# Patient Record
Sex: MALE | Race: WHITE | NOT HISPANIC OR LATINO | URBAN - METROPOLITAN AREA
[De-identification: names, ages, dates, MRNs, and addresses within clinical notes are randomized per-mention and may not be internally consistent; named-entity substitution may affect disease eponyms.]

---

## 2019-07-30 ENCOUNTER — EMERGENCY (EMERGENCY)
Facility: HOSPITAL | Age: 44
LOS: 1 days | Discharge: HOME | End: 2019-07-30
Attending: EMERGENCY MEDICINE | Admitting: EMERGENCY MEDICINE
Payer: COMMERCIAL

## 2019-07-30 VITALS
TEMPERATURE: 98 F | SYSTOLIC BLOOD PRESSURE: 100 MMHG | OXYGEN SATURATION: 98 % | RESPIRATION RATE: 18 BRPM | HEART RATE: 84 BPM | DIASTOLIC BLOOD PRESSURE: 61 MMHG

## 2019-07-30 VITALS
HEIGHT: 68 IN | SYSTOLIC BLOOD PRESSURE: 125 MMHG | RESPIRATION RATE: 16 BRPM | HEART RATE: 102 BPM | OXYGEN SATURATION: 98 % | WEIGHT: 197.09 LBS | DIASTOLIC BLOOD PRESSURE: 82 MMHG | TEMPERATURE: 98 F

## 2019-07-30 LAB
ALBUMIN SERPL ELPH-MCNC: 4.2 G/DL — SIGNIFICANT CHANGE UP (ref 3.3–5)
ALP SERPL-CCNC: 65 U/L — SIGNIFICANT CHANGE UP (ref 40–120)
ALT FLD-CCNC: 37 U/L — SIGNIFICANT CHANGE UP (ref 10–45)
ANION GAP SERPL CALC-SCNC: 13 MMOL/L — SIGNIFICANT CHANGE UP (ref 5–17)
AST SERPL-CCNC: 33 U/L — SIGNIFICANT CHANGE UP (ref 10–40)
BILIRUB SERPL-MCNC: 0.2 MG/DL — SIGNIFICANT CHANGE UP (ref 0.2–1.2)
BUN SERPL-MCNC: 7 MG/DL — SIGNIFICANT CHANGE UP (ref 7–23)
CALCIUM SERPL-MCNC: 9.4 MG/DL — SIGNIFICANT CHANGE UP (ref 8.4–10.5)
CHLORIDE SERPL-SCNC: 108 MMOL/L — SIGNIFICANT CHANGE UP (ref 96–108)
CO2 SERPL-SCNC: 24 MMOL/L — SIGNIFICANT CHANGE UP (ref 22–31)
CREAT SERPL-MCNC: 1.15 MG/DL — SIGNIFICANT CHANGE UP (ref 0.5–1.3)
GLUCOSE SERPL-MCNC: 136 MG/DL — HIGH (ref 70–99)
HCT VFR BLD CALC: 50.9 % — HIGH (ref 39–50)
HGB BLD-MCNC: 16.2 G/DL — SIGNIFICANT CHANGE UP (ref 13–17)
MCHC RBC-ENTMCNC: 27.8 PG — SIGNIFICANT CHANGE UP (ref 27–34)
MCHC RBC-ENTMCNC: 31.8 GM/DL — LOW (ref 32–36)
MCV RBC AUTO: 87.5 FL — SIGNIFICANT CHANGE UP (ref 80–100)
NRBC # BLD: 0 /100 WBCS — SIGNIFICANT CHANGE UP (ref 0–0)
PLATELET # BLD AUTO: 256 K/UL — SIGNIFICANT CHANGE UP (ref 150–400)
POTASSIUM SERPL-MCNC: 3.6 MMOL/L — SIGNIFICANT CHANGE UP (ref 3.5–5.3)
POTASSIUM SERPL-SCNC: 3.6 MMOL/L — SIGNIFICANT CHANGE UP (ref 3.5–5.3)
PROT SERPL-MCNC: 7.1 G/DL — SIGNIFICANT CHANGE UP (ref 6–8.3)
RBC # BLD: 5.82 M/UL — HIGH (ref 4.2–5.8)
RBC # FLD: 17.4 % — HIGH (ref 10.3–14.5)
SODIUM SERPL-SCNC: 145 MMOL/L — SIGNIFICANT CHANGE UP (ref 135–145)
TROPONIN T SERPL-MCNC: <0.01 NG/ML — SIGNIFICANT CHANGE UP (ref 0–0.01)
WBC # BLD: 10.42 K/UL — SIGNIFICANT CHANGE UP (ref 3.8–10.5)
WBC # FLD AUTO: 10.42 K/UL — SIGNIFICANT CHANGE UP (ref 3.8–10.5)

## 2019-07-30 PROCEDURE — 82962 GLUCOSE BLOOD TEST: CPT

## 2019-07-30 PROCEDURE — 93010 ELECTROCARDIOGRAM REPORT: CPT

## 2019-07-30 PROCEDURE — 82553 CREATINE MB FRACTION: CPT

## 2019-07-30 PROCEDURE — 93005 ELECTROCARDIOGRAM TRACING: CPT

## 2019-07-30 PROCEDURE — 99284 EMERGENCY DEPT VISIT MOD MDM: CPT

## 2019-07-30 PROCEDURE — 36415 COLL VENOUS BLD VENIPUNCTURE: CPT

## 2019-07-30 PROCEDURE — 84484 ASSAY OF TROPONIN QUANT: CPT

## 2019-07-30 PROCEDURE — 82550 ASSAY OF CK (CPK): CPT

## 2019-07-30 PROCEDURE — 80053 COMPREHEN METABOLIC PANEL: CPT

## 2019-07-30 PROCEDURE — 70450 CT HEAD/BRAIN W/O DYE: CPT | Mod: 26

## 2019-07-30 PROCEDURE — 99284 EMERGENCY DEPT VISIT MOD MDM: CPT | Mod: 25

## 2019-07-30 PROCEDURE — 85027 COMPLETE CBC AUTOMATED: CPT

## 2019-07-30 PROCEDURE — 70450 CT HEAD/BRAIN W/O DYE: CPT

## 2019-07-30 RX ORDER — SODIUM CHLORIDE 9 MG/ML
1000 INJECTION INTRAMUSCULAR; INTRAVENOUS; SUBCUTANEOUS ONCE
Refills: 0 | Status: COMPLETED | OUTPATIENT
Start: 2019-07-30 | End: 2019-07-30

## 2019-07-30 RX ADMIN — SODIUM CHLORIDE 1000 MILLILITER(S): 9 INJECTION INTRAMUSCULAR; INTRAVENOUS; SUBCUTANEOUS at 07:58

## 2019-07-30 RX ADMIN — SODIUM CHLORIDE 1000 MILLILITER(S): 9 INJECTION INTRAMUSCULAR; INTRAVENOUS; SUBCUTANEOUS at 07:59

## 2019-07-30 NOTE — ED PROVIDER NOTE - NEUROLOGICAL, MLM
Alert and oriented, no focal deficits, no motor deficits. pt reports " slightly gritty sensation in comparison to right w light touch of L side"  CN'II-XII intact, no pronator drift, nl finger to nose, clear speech, gait intact

## 2019-07-30 NOTE — ED ADULT NURSE REASSESSMENT NOTE - NS ED NURSE REASSESS COMMENT FT1
Patient FS 40, Dr. Castillo notified.  Patient is awake, A&O x 3, NAD, given two 8 oz cups of Apple juice.  Will recheck glucose in 15 mins.

## 2019-07-30 NOTE — ED ADULT NURSE NOTE - CHIEF COMPLAINT QUOTE
I have erratic blood sugar ( Type 1 Diabetes- on Humalog and Lantus)  , was 32 90 mins ago (drsia GONZALES ate gummy bears)  went up to 50 ; have labored breathing, loss of coordination; I'm also concern of brain concussion from fall 2 mos ago- I feel uncoordinated, pressure to head and eye

## 2019-07-30 NOTE — ED PROVIDER NOTE - CLINICAL SUMMARY MEDICAL DECISION MAKING FREE TEXT BOX
44 yo with transient resolving neurologic deficit at time of prolonged hypoglyemia, most likely secondary to hypoglycemia, doubt CVA, will CT head for surveillance for gross abnormality as pt with head trauma a month ago w persistent symptoms.   Dispo pending workup.

## 2019-07-30 NOTE — ED ADULT NURSE REASSESSMENT NOTE - NS ED NURSE REASSESS COMMENT FT1
Patient is awake, A&O x 3, NAD, presents to ED for hypoglycemia.  His FS at home in the 50's, he drink orange juice and ate gummy snacks, repeat FS in the 50's, patient became concerned and came to the ED for eval.  Also, complaining of SOB, pressure to the right sided of his head and numbness to his left extremity.  He is concerned about a possible concussion from a fall a few months ago.  Respirations equal and unlabored, speaking in full sentences.  Patient appears anxious and states, "he becomes over concerned when it comes to his medical problems and seeing any doctor".  Denies any s/sx at this time.  Will continue to monitor patient.

## 2019-07-30 NOTE — ED PEDIATRIC NURSE REASSESSMENT NOTE - NS ED NURSE REASSESS COMMENT FT2
Dr. Hampton spoke with patient, he agreed to eat a sandwich.  Patient given food and juice.  Will recheck glucose level.

## 2019-07-30 NOTE — ED PROVIDER NOTE - OBJECTIVE STATEMENT
44 yo with ho of DMI , reports he was staying up all night doing work and started feeling hypoglycemic around an hr prior to arrival, low 30 , only came up to 50 after half an hr after OJ and gummy bears, came to hospital due to slow resolution of symptoms as well as felt different than usual hypoglycemic episodes, sweaty and nauseous as usual however felt L sided numbness and weakness of L arm and L leg, associated dull L sided headache, reports in addition on month ago he had a trip and fall and hit head into marble on ground, no LOC but had typical concussion symptoms at that time , headaches, nausea , dizzyness, mild headaches have persisted daily, pt w ho of Generalized anxiety and PTSD.  pt reports feeling very thirsty

## 2019-07-30 NOTE — ED ADULT NURSE REASSESSMENT NOTE - NS ED NURSE REASSESS COMMENT FT1
Patient repeat FS 53.  Patient offered food to eat and he declined the food.  Educated the patient on the importance of consuming carbohydrates to increase and maintain his glucose.  Patient still decline any food at this time.  Dr. Hampton notified.

## 2019-07-30 NOTE — ED ADULT TRIAGE NOTE - CHIEF COMPLAINT QUOTE
I have erratic blood sugar  , was 32 90 mins ago (drank OJ ate gummy bears)  went up to 50 ; have labored breathing, loss of coordination; I'm also concern of brain concussion from fall 2 mos ago- I feel uncoordinated, pressure to head and eye I have erratic blood sugar ( Type 1 Diabetes- on Humalog and Lantus)  , was 32 90 mins ago (drsia GONZALES ate gummy bears)  went up to 50 ; have labored breathing, loss of coordination; I'm also concern of brain concussion from fall 2 mos ago- I feel uncoordinated, pressure to head and eye

## 2019-07-30 NOTE — ED ADULT NURSE NOTE - NSIMPLEMENTINTERV_GEN_ALL_ED
Implemented All Universal Safety Interventions:  [place] to call system. Call bell, personal items and telephone within reach. Instruct patient to call for assistance. Room bathroom lighting operational. Non-slip footwear when patient is off stretcher. Physically safe environment: no spills, clutter or unnecessary equipment. Stretcher in lowest position, wheels locked, appropriate side rails in place.

## 2019-07-30 NOTE — ED PROVIDER NOTE - PROGRESS NOTE DETAILS
recurrent hypoglycemia, will feed meal w protein / observe ,   new T wave inversions on EKG, will keep for second trop , pt never w chest pain, if second negative will advise Cardiology follow up . all symptoms resolved, advised cardiology f/u , pcp f/u , advised increased glucose surveillance.

## 2019-07-30 NOTE — ED PROVIDER NOTE - PSYCHIATRIC, MLM
Alert and oriented to person, place, time/situation. somewhat anxious, pressured speech, no apparent risk to self or others.

## 2019-07-30 NOTE — ED ADULT NURSE NOTE - OBJECTIVE STATEMENT
Received a 43 year old male with a chief complaint of labored breathing, reported loss of coordination, pressure from the right side of his head, and a "low blood sugar level this morning." Reported CBG at home was 50. Patient reports that he had in-taken oral carbohydrates (orange juice and gummy snacks) and reports that his CBG went to 200. Patient with a reported PMH of DM. Patient with an additional complaint of pressure to his head and a numbness to the left upper extremity. Patient with noted full range of motion to all extremities. Patient is observed to be able to ambulate with a steady gait. No slurring of speech or facial droop. Patient reports that he is anxious based from previous in-patient hospital experiences.

## 2019-08-02 DIAGNOSIS — E10.649 TYPE 1 DIABETES MELLITUS WITH HYPOGLYCEMIA WITHOUT COMA: ICD-10-CM

## 2019-08-02 DIAGNOSIS — E16.2 HYPOGLYCEMIA, UNSPECIFIED: ICD-10-CM

## 2019-08-02 DIAGNOSIS — Z79.4 LONG TERM (CURRENT) USE OF INSULIN: ICD-10-CM
